# Patient Record
Sex: MALE | Race: WHITE | Employment: FULL TIME | RURAL
[De-identification: names, ages, dates, MRNs, and addresses within clinical notes are randomized per-mention and may not be internally consistent; named-entity substitution may affect disease eponyms.]

---

## 2021-01-21 ENCOUNTER — OFFICE VISIT (OUTPATIENT)
Dept: CARDIOLOGY CLINIC | Age: 66
End: 2021-01-21
Payer: MEDICARE

## 2021-01-21 VITALS
OXYGEN SATURATION: 97 % | HEART RATE: 80 BPM | TEMPERATURE: 98.1 F | WEIGHT: 193 LBS | BODY MASS INDEX: 27.63 KG/M2 | RESPIRATION RATE: 16 BRPM | DIASTOLIC BLOOD PRESSURE: 78 MMHG | SYSTOLIC BLOOD PRESSURE: 130 MMHG | HEIGHT: 70 IN

## 2021-01-21 DIAGNOSIS — Z82.49 FH: CAD (CORONARY ARTERY DISEASE): ICD-10-CM

## 2021-01-21 DIAGNOSIS — I10 ESSENTIAL HYPERTENSION: Primary | ICD-10-CM

## 2021-01-21 DIAGNOSIS — I25.10 CORONARY ARTERY CALCIFICATION: ICD-10-CM

## 2021-01-21 DIAGNOSIS — E78.5 DYSLIPIDEMIA: ICD-10-CM

## 2021-01-21 DIAGNOSIS — I25.84 CORONARY ARTERY CALCIFICATION: ICD-10-CM

## 2021-01-21 PROCEDURE — 3017F COLORECTAL CA SCREEN DOC REV: CPT | Performed by: INTERNAL MEDICINE

## 2021-01-21 PROCEDURE — 99203 OFFICE O/P NEW LOW 30 MIN: CPT | Performed by: INTERNAL MEDICINE

## 2021-01-21 PROCEDURE — 1101F PT FALLS ASSESS-DOCD LE1/YR: CPT | Performed by: INTERNAL MEDICINE

## 2021-01-21 PROCEDURE — G8510 SCR DEP NEG, NO PLAN REQD: HCPCS | Performed by: INTERNAL MEDICINE

## 2021-01-21 PROCEDURE — G8536 NO DOC ELDER MAL SCRN: HCPCS | Performed by: INTERNAL MEDICINE

## 2021-01-21 PROCEDURE — G8419 CALC BMI OUT NRM PARAM NOF/U: HCPCS | Performed by: INTERNAL MEDICINE

## 2021-01-21 PROCEDURE — G8754 DIAS BP LESS 90: HCPCS | Performed by: INTERNAL MEDICINE

## 2021-01-21 PROCEDURE — 93000 ELECTROCARDIOGRAM COMPLETE: CPT | Performed by: INTERNAL MEDICINE

## 2021-01-21 PROCEDURE — G8752 SYS BP LESS 140: HCPCS | Performed by: INTERNAL MEDICINE

## 2021-01-21 PROCEDURE — G8427 DOCREV CUR MEDS BY ELIG CLIN: HCPCS | Performed by: INTERNAL MEDICINE

## 2021-01-21 RX ORDER — TRAVOPROST OPHTHALMIC SOLUTION 0.04 MG/ML
1 SOLUTION OPHTHALMIC EVERY EVENING
COMMUNITY
End: 2022-04-12

## 2021-01-21 RX ORDER — IRBESARTAN 75 MG/1
37.5 TABLET ORAL
COMMUNITY

## 2021-01-21 RX ORDER — AZELAIC ACID 0.15 G/G
GEL TOPICAL
COMMUNITY

## 2021-01-21 NOTE — PROGRESS NOTES
Verified patient with two patient identifiers. Medications reviewed/approved by Dr. Rahat Fleming. Chief Complaint   Patient presents with    New Patient     Referred by Edd Ely NP    Hypertension     1. Have you been to the ER, urgent care clinic since your last visit? Hospitalized since your last visit? new pt    2. Have you seen or consulted any other health care providers outside of the [address] [place] [address] since your last visit? Include any pap smears or colon screening.  New pt

## 2021-01-21 NOTE — PROGRESS NOTES
Tu Mirza is a 77 y.o. male is here for cardiacevaluation--elevated coronary artery calcium score (VCS 20)--, LCX 17.8, , Total 362 (75%). Hx hypertension, dyslipidemia on rx. No CV sx. Labs 10/9/20 with CBC, CMP normal, chol 121, LDL 45, HDL 33,  0n low dose crestor). The patient denies chest pain/ shortness of breath, orthopnea, PND, LE edema, palpitations, syncope, presyncope or fatigue. Patient Active Problem List    Diagnosis Date Noted    Essential hypertension 2021    Dyslipidemia 2021    Coronary artery calcification 2021      Rory Roberts NP  Past Medical History:   Diagnosis Date    Coronary artery calcification     Dyslipidemia     Fatty liver 2006    GERD (gastroesophageal reflux disease)     GERD (gastroesophageal reflux disease)     Hypertension     Shoulder fracture 2016      Past Surgical History:   Procedure Laterality Date    HX ROTATOR CUFF REPAIR Right 2016    IR BX LIVER PERCUTANEOUS  2006     No Known Allergies   Family History   Problem Relation Age of Onset    Breast Cancer Mother     Heart Attack Father          52's MI    Diabetes Father     High Cholesterol Father       Social History     Socioeconomic History    Marital status:      Spouse name: Not on file    Number of children: Not on file    Years of education: Not on file    Highest education level: Not on file   Occupational History    Not on file   Social Needs    Financial resource strain: Not on file    Food insecurity     Worry: Not on file     Inability: Not on file    Transportation needs     Medical: Not on file     Non-medical: Not on file   Tobacco Use    Smoking status: Never Smoker    Smokeless tobacco: Never Used   Substance and Sexual Activity    Alcohol use:  Yes     Alcohol/week: 3.0 standard drinks     Types: 3 Cans of beer per week     Comment: 3 drink per week    Drug use: Not on file    Sexual activity: Not on file   Lifestyle    Physical activity     Days per week: Not on file     Minutes per session: Not on file    Stress: Not on file   Relationships    Social connections     Talks on phone: Not on file     Gets together: Not on file     Attends Confucianism service: Not on file     Active member of club or organization: Not on file     Attends meetings of clubs or organizations: Not on file     Relationship status: Not on file    Intimate partner violence     Fear of current or ex partner: Not on file     Emotionally abused: Not on file     Physically abused: Not on file     Forced sexual activity: Not on file   Other Topics Concern    Not on file   Social History Narrative    Not on file      Current Outpatient Medications   Medication Sig    travoprost (Travatan Z) 0.004 % ophthalmic solution Administer 1 Drop to both eyes every evening.  azelaic acid (Finacea) 15 % topical gel Apply  to affected area four (4) days a week.  irbesartan (AVAPRO) 75 mg tablet Take 75 mg by mouth nightly.  omeprazole (PRILOSEC) 10 mg capsule Take 10 mg by mouth daily.  rosuvastatin (CRESTOR) 10 mg tablet Take 10 mg by mouth nightly. 1/2 tablet    aspirin 81 mg chewable tablet Take 81 mg by mouth daily.  multivitamin (ONE A DAY) tablet Take 1 Tab by mouth daily. No current facility-administered medications for this visit. Review of Symptoms:    CONST  No weight change. No fever, chills, sweats    ENT No visual changes, URI sx, sore throat    CV  See HPI   RESP  No cough, or sputum, wheezing. Also see HPI   GI  No abdominal pain or change in bowel habits. No heartburn or dysphagia. No melena or rectal bleeding.   No dysuria, urgency, frequency, hematuria   MSKEL  No joint pain, swelling. No muscle pain. SKIN  No rash or lesions. NEURO  No headache, syncope, or seizure. No weakness, loss of sensation, or paresthesias. PSYCH  No low mood or depression  No anxiety. HE/LYMPH  No easy bruising, abnormal bleeding, or enlarged glands. Physical ExamPhysical Exam:    Visit Vitals  /78 (BP 1 Location: Left arm, BP Patient Position: Sitting)   Pulse 80   Temp 98.1 °F (36.7 °C) (Temporal)   Resp 16   Ht 5' 10\" (1.778 m)   Wt 193 lb (87.5 kg)   SpO2 97% Comment: ra   BMI 27.69 kg/m²     Gen: NAD  HEENT:  PERRL, throat clear  Neck: no adenopathy, no thyromegaly, no JVD   Heart:  Regular,Nl S1S2,  no murmur, gallop or rub. Lungs:  clear  Abdomen:   Soft, non-tender, bowel sounds are active. Extremities:  No edema  Pulse: symmetric  Neuro: A&O times 3, No focal neuro deficits    Cardiographics    ECG: from 10/11/19--NSR, wnl      Labs:   Lab Results   Component Value Date/Time    Sodium 142 01/28/2016 01:40 PM    Potassium 4.3 01/28/2016 01:40 PM    Chloride 103 01/28/2016 01:40 PM    CO2 30 01/28/2016 01:40 PM    Anion gap 9 01/28/2016 01:40 PM    Glucose 80 01/28/2016 01:40 PM    BUN 17 01/17/2018 07:43 AM    BUN 13 01/28/2016 01:40 PM    Creatinine 1.14 01/17/2018 07:43 AM    Creatinine 0.89 01/28/2016 01:40 PM    BUN/Creatinine ratio 15 01/28/2016 01:40 PM    GFR est AA >60 01/17/2018 07:43 AM    GFR est AA >60 01/28/2016 01:40 PM    GFR est non-AA >60 01/17/2018 07:43 AM    GFR est non-AA >60 01/28/2016 01:40 PM    Calcium 8.8 01/28/2016 01:40 PM     No results found for: CPK, CPKX, CPX  No results found for: CHOL, CHOLX, CHLST, CHOLV, 122353, HDL, HDLP, LDL, LDLC, DLDLP, TGLX, TRIGL, TRIGP, CHHD, CHHDX  No results found for this or any previous visit. Assessment:         Patient Active Problem List    Diagnosis Date Noted    Essential hypertension 01/21/2021    Dyslipidemia 01/21/2021    Coronary artery calcification 01/21/2021      77 y.o. male is here for cardiacevaluation--elevated coronary artery calcium score (VCS 11/13/20)--, LCX 17.8, , Total 362 (75%). Hx hypertension, dyslipidemia on rx. No CV sx.   Labs 10/9/20 with CBC, CMP normal, chol 121, LDL 45, HDL 33,  0n low dose crestor). Plan:     Doing well with no adverse cardiac symptoms. Elevated coronary calcium score as noted--CV risks with hypertension, dyslipidemia--well controlled. Fishoil/omega 3   Dietary rx  Continue statin, ARB  ASA 81  Stress MPI--r/o ischemia   Lipids and labs followed by PCP. Continue current care and f/u in 12 months.     Juana Mcghee MD

## 2021-01-21 NOTE — LETTER
1/21/2021 Patient: Мария Grissom YOB: 1955 Date of Visit: 1/21/2021 Lindsey Rodriguez NP 
[address] [facility] [phone] Via Fax: [phone] Dear Lindsey Rodriguez NP, Thank you for referring Mr. Ruthann Day to [facility] for evaluation. My notes for this consultation are attached. If you have questions, please do not hesitate to call me. I look forward to following your patient along with you.  
 
 
Sincerely, 
 
Gerry Yen MD

## 2021-02-23 ENCOUNTER — TELEPHONE (OUTPATIENT)
Dept: CARDIOLOGY CLINIC | Age: 66
End: 2021-02-23

## 2021-02-23 NOTE — TELEPHONE ENCOUNTER
----- Message from Ariel Friedman MD sent at 2/23/2021  9:33 AM EST -----  Regarding: Stress Cardiolyte  Advise stress nuclear treadmill test completely normal--no blockages or ischemia on nuclear imaging, normal pumping function, overall normal.   RTC 12 mos, sooner prn.   Thanks [facility]

## 2021-08-03 ENCOUNTER — TRANSCRIBE ORDER (OUTPATIENT)
Dept: REGISTRATION | Age: 66
End: 2021-08-03

## 2021-08-03 ENCOUNTER — HOSPITAL ENCOUNTER (OUTPATIENT)
Dept: GENERAL RADIOLOGY | Age: 66
Discharge: HOME | End: 2021-08-03
Payer: MEDICARE

## 2021-08-03 DIAGNOSIS — M54.2 CERVICALGIA: ICD-10-CM

## 2021-08-03 DIAGNOSIS — M25.552 LEFT HIP PAIN: ICD-10-CM

## 2021-08-03 DIAGNOSIS — M54.9 DORSALGIA: ICD-10-CM

## 2021-08-03 DIAGNOSIS — M25.512 LEFT SHOULDER PAIN: Primary | ICD-10-CM

## 2021-08-03 DIAGNOSIS — M25.512 LEFT SHOULDER PAIN: ICD-10-CM

## 2021-08-03 DIAGNOSIS — M54.2 CERVICALGIA: Primary | ICD-10-CM

## 2021-08-03 PROCEDURE — 73502 X-RAY EXAM HIP UNI 2-3 VIEWS: CPT

## 2021-08-03 PROCEDURE — 72050 X-RAY EXAM NECK SPINE 4/5VWS: CPT

## 2021-08-03 PROCEDURE — 72070 X-RAY EXAM THORAC SPINE 2VWS: CPT

## 2021-08-03 PROCEDURE — 72110 X-RAY EXAM L-2 SPINE 4/>VWS: CPT

## 2021-08-03 PROCEDURE — 73030 X-RAY EXAM OF SHOULDER: CPT

## 2022-03-18 PROBLEM — I25.84 CORONARY ARTERY CALCIFICATION: Status: ACTIVE | Noted: 2021-01-21

## 2022-03-18 PROBLEM — I25.10 CORONARY ARTERY CALCIFICATION: Status: ACTIVE | Noted: 2021-01-21

## 2022-03-19 PROBLEM — E78.5 DYSLIPIDEMIA: Status: ACTIVE | Noted: 2021-01-21

## 2022-03-19 PROBLEM — I10 ESSENTIAL HYPERTENSION: Status: ACTIVE | Noted: 2021-01-21

## 2022-04-09 NOTE — PROGRESS NOTES
Elvin Middleton is a 74 y.o. male is here for follow up. Pmhx CAD as evidenced by elevated coronary artery calcium score (VCS 20)--, LCX 17.8, , Total 362 (75%). Hx hypertension, dyslipidemia on rx. Last seen by Dr Yasemin Barrera 2021. He had NST on 2021 which was fine. Most recent lipids 10/2021 showed , HDL 31,  and LDL 52. Continues to do well from cardiac standpoint. He works out about 3 days a week,  More so during warmer weather. No exertional symptoms. The patient denies chest pain/ shortness of breath, orthopnea, PND, LE edema, palpitations, syncope, presyncope or fatigue. Patient Active Problem List    Diagnosis Date Noted    Essential hypertension 2021    Dyslipidemia 2021    Coronary artery calcification 2021      Jose Juan Marino NP  Past Medical History:   Diagnosis Date    Coronary artery calcification     Dyslipidemia     Fatty liver 2006    GERD (gastroesophageal reflux disease)     GERD (gastroesophageal reflux disease)     Hypertension     Shoulder fracture 2016      Past Surgical History:   Procedure Laterality Date    HX ROTATOR CUFF REPAIR Right 2016    IR BX LIVER PERCUTANEOUS  2006     No Known Allergies   Family History   Problem Relation Age of Onset    Breast Cancer Mother     Heart Attack Father          52's MI    Diabetes Father     High Cholesterol Father       Social History     Socioeconomic History    Marital status:      Spouse name: Not on file    Number of children: Not on file    Years of education: Not on file    Highest education level: Not on file   Occupational History    Not on file   Tobacco Use    Smoking status: Never Smoker    Smokeless tobacco: Never Used   Substance and Sexual Activity    Alcohol use:  Yes     Alcohol/week: 3.0 standard drinks     Types: 3 Cans of beer per week     Comment: 3 drink per week    Drug use: Not on file    Sexual activity: Not on file   Other Topics Concern    Not on file   Social History Narrative    Not on file     Social Determinants of Health     Financial Resource Strain:     Difficulty of Paying Living Expenses: Not on file   Food Insecurity:     Worried About Running Out of Food in the Last Year: Not on file    Jairo of Food in the Last Year: Not on file   Transportation Needs:     Lack of Transportation (Medical): Not on file    Lack of Transportation (Non-Medical): Not on file   Physical Activity:     Days of Exercise per Week: Not on file    Minutes of Exercise per Session: Not on file   Stress:     Feeling of Stress : Not on file   Social Connections:     Frequency of Communication with Friends and Family: Not on file    Frequency of Social Gatherings with Friends and Family: Not on file    Attends Cheondoism Services: Not on file    Active Member of [address] [place] [address] [employer] or Organizations: Not on file    Attends Club or Organization Meetings: Not on file    Marital Status: Not on file   Intimate Partner Violence:     Fear of Current or Ex-Partner: Not on file    Emotionally Abused: Not on file    Physically Abused: Not on file    Sexually Abused: Not on file   Housing Stability:     Unable to Pay for Housing in the Last Year: Not on file    Number of [place] in the Last Year: Not on file    Unstable Housing in the Last Year: Not on file      Current Outpatient Medications   Medication Sig    travoprost (Travatan Z) 0.004 % ophthalmic solution Administer 1 Drop to both eyes every evening.  azelaic acid (Finacea) 15 % topical gel Apply  to affected area four (4) days a week.  irbesartan (AVAPRO) 75 mg tablet Take 75 mg by mouth nightly.  omeprazole (PRILOSEC) 10 mg capsule Take 10 mg by mouth daily.  rosuvastatin (CRESTOR) 10 mg tablet Take 10 mg by mouth nightly. 1/2 tablet    aspirin 81 mg chewable tablet Take 81 mg by mouth daily.     multivitamin (ONE A DAY) tablet Take 1 Tab by mouth daily. No current facility-administered medications for this visit. Review of Symptoms:    CONST  No weight change. No fever, chills, sweats    ENT No visual changes, URI sx, sore throat    CV  See HPI   RESP  No cough, or sputum, wheezing. Also see HPI   GI  No abdominal pain or change in bowel habits. No heartburn or dysphagia. No melena or rectal bleeding.   No dysuria, urgency, frequency, hematuria   MSKEL  No joint pain, swelling. No muscle pain. SKIN  No rash or lesions. NEURO  No headache, syncope, or seizure. No weakness, loss of sensation, or paresthesias. PSYCH  No low mood or depression  No anxiety. HE/LYMPH  No easy bruising, abnormal bleeding, or enlarged glands. Physical ExamPhysical Exam:    There were no vitals taken for this visit. Gen: NAD  HEENT:  PERRL, throat clear  Neck: no adenopathy, no thyromegaly, no JVD   Heart:  Regular,Nl S1S2,  no murmur, gallop or rub. Lungs:  clear  Abdomen:   Soft, non-tender, bowel sounds are active.    Extremities:  No edema  Pulse: symmetric  Neuro: A&O times 3, No focal neuro deficits    Cardiographics    ECG: NSR, wnl      Labs:   Lab Results   Component Value Date/Time    Sodium 142 01/28/2016 01:40 PM    Potassium 4.3 01/28/2016 01:40 PM    Chloride 103 01/28/2016 01:40 PM    CO2 30 01/28/2016 01:40 PM    Anion gap 9 01/28/2016 01:40 PM    Glucose 80 01/28/2016 01:40 PM    BUN 17 01/17/2018 07:43 AM    BUN 13 01/28/2016 01:40 PM    Creatinine 1.14 01/17/2018 07:43 AM    Creatinine 0.89 01/28/2016 01:40 PM    BUN/Creatinine ratio 15 01/28/2016 01:40 PM    GFR est AA >60 01/17/2018 07:43 AM    GFR est AA >60 01/28/2016 01:40 PM    GFR est non-AA >60 01/17/2018 07:43 AM    GFR est non-AA >60 01/28/2016 01:40 PM    Calcium 8.8 01/28/2016 01:40 PM     No results found for: CPK, CPKX, CPX  No results found for: CHOL, CHOLX, CHLST, CHOLV, 187523, HDL, HDLP, LDL, LDLC, DLDLP, TGLX, TRIGL, TRIGP, CHHD, CHHDX  No results found for this or any previous visit. Assessment:         Patient Active Problem List    Diagnosis Date Noted    Essential hypertension 01/21/2021    Dyslipidemia 01/21/2021    Coronary artery calcification 01/21/2021      77 y.o. male is here for cardiacevaluation--elevated coronary artery calcium score (VCS 11/13/20)--, LCX 17.8, , Total 362 (75%). Hx hypertension, dyslipidemia on rx. No CV sx. Labs 10/9/20 with CBC, CMP normal, chol 121, LDL 45, HDL 33,  0n low dose crestor). Doing well with no adverse cardiac symptoms. Elevated coronary calcium score as noted--CV risks with hypertension, dyslipidemia--well controlled. Fishoil/omega 3   Dietary rx  Continue statin, ARB  ASA 81  Stress MPI--r/o ischemia   Lipids and labs followed by PCP. NST 2/23/2021  Impression:   Normal gated rest/stress myocardial perfusion imaging study. No evidence of myocardial ischemia or infarction. Left ventricular ejection fraction is 71 %.          Plan:       Agatston CAC score 200-399  Coronary artery calcification:  (VCS 11/13/20)--, LCX 17.8, , Total 362 (75%)  FH: CAD (coronary artery disease)  No evidence of myocardial ischemia or infarction. Left ventricular ejection fraction is 71 %. Per NST in 2/23/2021  Continue ASA statin      Essential hypertension  Controlled with current therapy  Stable Serum Cr 0.9 in 10/2021    HLD  10/2021 LDL 52 On rosuva 10 mg Labs and lipids per PCP    Discussed importance of diet and exercise - eventual goal of 30 - 60 minutes, 5-7 times a week as per AHA guideline. Goal of 10 % (19 lbs) weight loss for 6 months. Continue current care and f/u in 12 months.     Coby Dunn NP

## 2022-04-12 ENCOUNTER — OFFICE VISIT (OUTPATIENT)
Dept: CARDIOLOGY CLINIC | Age: 67
End: 2022-04-12
Payer: MEDICARE

## 2022-04-12 VITALS
WEIGHT: 193 LBS | HEIGHT: 70 IN | TEMPERATURE: 98.3 F | DIASTOLIC BLOOD PRESSURE: 82 MMHG | OXYGEN SATURATION: 97 % | RESPIRATION RATE: 18 BRPM | HEART RATE: 73 BPM | SYSTOLIC BLOOD PRESSURE: 144 MMHG | BODY MASS INDEX: 27.63 KG/M2

## 2022-04-12 DIAGNOSIS — I10 ESSENTIAL HYPERTENSION: Primary | ICD-10-CM

## 2022-04-12 DIAGNOSIS — I25.84 CORONARY ARTERY CALCIFICATION: ICD-10-CM

## 2022-04-12 DIAGNOSIS — Z82.49 FH: CAD (CORONARY ARTERY DISEASE): ICD-10-CM

## 2022-04-12 DIAGNOSIS — R93.1 AGATSTON CAC SCORE 200-399: ICD-10-CM

## 2022-04-12 DIAGNOSIS — E78.5 DYSLIPIDEMIA: ICD-10-CM

## 2022-04-12 DIAGNOSIS — I25.10 CORONARY ARTERY CALCIFICATION: ICD-10-CM

## 2022-04-12 PROCEDURE — G8419 CALC BMI OUT NRM PARAM NOF/U: HCPCS | Performed by: NURSE PRACTITIONER

## 2022-04-12 PROCEDURE — 99214 OFFICE O/P EST MOD 30 MIN: CPT | Performed by: NURSE PRACTITIONER

## 2022-04-12 PROCEDURE — 3017F COLORECTAL CA SCREEN DOC REV: CPT | Performed by: NURSE PRACTITIONER

## 2022-04-12 PROCEDURE — G8536 NO DOC ELDER MAL SCRN: HCPCS | Performed by: NURSE PRACTITIONER

## 2022-04-12 PROCEDURE — 1101F PT FALLS ASSESS-DOCD LE1/YR: CPT | Performed by: NURSE PRACTITIONER

## 2022-04-12 PROCEDURE — G8432 DEP SCR NOT DOC, RNG: HCPCS | Performed by: NURSE PRACTITIONER

## 2022-04-12 PROCEDURE — 93000 ELECTROCARDIOGRAM COMPLETE: CPT | Performed by: NURSE PRACTITIONER

## 2022-04-12 PROCEDURE — G8753 SYS BP > OR = 140: HCPCS | Performed by: NURSE PRACTITIONER

## 2022-04-12 PROCEDURE — G8754 DIAS BP LESS 90: HCPCS | Performed by: NURSE PRACTITIONER

## 2022-04-12 PROCEDURE — G8427 DOCREV CUR MEDS BY ELIG CLIN: HCPCS | Performed by: NURSE PRACTITIONER

## 2022-04-12 RX ORDER — LATANOPROST 50 UG/ML
1 SOLUTION/ DROPS OPHTHALMIC
COMMUNITY
Start: 2022-02-18

## 2022-04-12 RX ORDER — MELATONIN
1000 DAILY
COMMUNITY

## 2022-04-12 NOTE — PROGRESS NOTES
Identified pt with two pt identifiers(name and ). Reviewed record in preparation for visit and have obtained necessary documentation. Chief Complaint   Patient presents with    Coronary Artery Disease     follow up    Hypertension    Cholesterol Problem      Vitals:    22 1101   BP: (!) 144/82   Pulse: 73   Resp: 18   Temp: 98.3 °F (36.8 °C)   TempSrc: Temporal   SpO2: 97%   Weight: 193 lb (87.5 kg)   Height: 5' 10\" (1.778 m)   PainSc:   0 - No pain       Medications reviewed/approved by provider. Health Maintenance Review: Patient reminded of \"due or due soon\" health maintenance. I have asked the patient to contact his/her primary care provider (PCP) for follow-up on his/her health maintenance. Coordination of Care Questionnaire:  :   1) Have you been to an emergency room, urgent care, or hospitalized since your last visit? If yes, where when, and reason for visit? no       2. Have seen or consulted any other health care provider since your last visit? If yes, where when, and reason for visit? YES Janeen Childress NP for routine care. Patient is accompanied by self I have received verbal consent from Sammy Evans to discuss any/all medical information while they are present in the room.

## 2022-10-12 ENCOUNTER — HOSPITAL ENCOUNTER (OUTPATIENT)
Dept: MRI IMAGING | Age: 67
Discharge: HOME | End: 2022-10-12
Attending: NURSE PRACTITIONER
Payer: MEDICARE

## 2022-10-12 DIAGNOSIS — M54.41 LUMBAGO WITH SCIATICA, RIGHT SIDE: ICD-10-CM

## 2022-10-12 PROCEDURE — 72158 MRI LUMBAR SPINE W/O & W/DYE: CPT

## 2022-10-12 PROCEDURE — A9576 INJ PROHANCE MULTIPACK: HCPCS

## 2022-10-12 PROCEDURE — 74011250636 HC RX REV CODE- 250/636

## 2022-10-12 RX ADMIN — GADOTERIDOL 18 ML: 279.3 INJECTION, SOLUTION INTRAVENOUS at 08:46

## 2024-08-22 ENCOUNTER — TRANSCRIBE ORDERS (OUTPATIENT)
Facility: HOSPITAL | Age: 69
End: 2024-08-22

## 2024-08-22 DIAGNOSIS — M47.26 OSTEOARTHRITIS OF SPINE WITH RADICULOPATHY, LUMBAR REGION: ICD-10-CM

## 2024-08-22 DIAGNOSIS — M51.26 LUMBAR DISC HERNIATION: ICD-10-CM

## 2024-08-22 DIAGNOSIS — M54.32 LEFT SIDED SCIATICA: ICD-10-CM

## 2024-08-22 DIAGNOSIS — M54.50 LUMBAR PAIN: Primary | ICD-10-CM

## 2024-08-22 DIAGNOSIS — M54.2 CERVICALGIA: ICD-10-CM

## 2024-09-03 ENCOUNTER — HOSPITAL ENCOUNTER (OUTPATIENT)
Facility: HOSPITAL | Age: 69
Discharge: HOME | End: 2024-09-06
Attending: ORTHOPAEDIC SURGERY
Payer: MEDICARE

## 2024-09-03 DIAGNOSIS — M54.2 CERVICALGIA: ICD-10-CM

## 2024-09-03 DIAGNOSIS — M54.50 LUMBAR PAIN: ICD-10-CM

## 2024-09-03 DIAGNOSIS — M47.26 OSTEOARTHRITIS OF SPINE WITH RADICULOPATHY, LUMBAR REGION: ICD-10-CM

## 2024-09-03 DIAGNOSIS — M51.26 LUMBAR DISC HERNIATION: ICD-10-CM

## 2024-09-03 DIAGNOSIS — M54.32 LEFT SIDED SCIATICA: ICD-10-CM

## 2024-09-03 PROCEDURE — 72141 MRI NECK SPINE W/O DYE: CPT

## 2024-09-03 PROCEDURE — 72148 MRI LUMBAR SPINE W/O DYE: CPT

## 2024-11-20 ENCOUNTER — TELEPHONE (OUTPATIENT)
Age: 69
End: 2024-11-20

## 2024-11-20 NOTE — TELEPHONE ENCOUNTER
Patient states he would like a call back to get an appointment with  for numbness he has been experiencing in his arms.      Phone [phone]

## 2024-11-22 ENCOUNTER — TELEPHONE (OUTPATIENT)
Age: 69
End: 2024-11-22

## 2024-11-22 NOTE — TELEPHONE ENCOUNTER
Patient has been schedule as an Established patient with the provider for 11/25/24 at the [facility].Patient last visit was with ROWENA Rodas in 2023.    Patient has Bilateral tingling and numbness in both arms.    [phone]

## 2024-11-23 PROBLEM — I25.10 ASCVD (ARTERIOSCLEROTIC CARDIOVASCULAR DISEASE): Status: ACTIVE | Noted: 2021-01-21

## 2024-11-23 PROBLEM — I10 PRIMARY HYPERTENSION: Status: ACTIVE | Noted: 2021-01-21

## 2024-11-23 NOTE — PROGRESS NOTES
ASSESSMENT and PLAN  1.  Bilateral arm weakness  Atypical for a cardiac etiology.  Suspect this may be related to his C-spine disease.  He remains concerned the symptoms may represent a cardiac problem and so for completeness, I recommended reevaluation with an exercise Cardiolite stress test.    2. ASCVD (arteriosclerotic cardiovascular disease)  Calcium score 362 (75th percentile) on cardiac CT 11/13/2020 and no ischemia at 13.4 METS on GXT Cardiolite 2/23/2021.  Continue risk factor modification efforts.  LDL goal <70.    3. Primary hypertension  Well-controlled.  Continue current medications.  Managed by PCP.       Follow-up in 1 year.  In the interim, we will contact him with the results of his exercise Cardiolite stress test and any additional recommendations.  The patient has been instructed and agrees to call our office with any issues or other concerns related to their cardiac condition(s) and/or complaint(s).      CHIEF COMPLAINT  Arm numbness    HPI:    Prakash Rae is a 69 y.o. male here for an acute visit at his request.  He was last seen in the office 4/12/2023 by Danna Rodas NP for follow-up regarding coronary calcification and cardiac risk factors.  His last cardiac study was a exercise Cardiolite stress test on 2/23/2021 that was negative for ischemia at a workload of 13.4 METS.  No acute cardiac issues have developed since the last visit.    He wanted to be seen regarding 2 recent episodes of transient bilateral arm weakness without associated tingling, numbness, chest pain, dyspnea or palpitations.  Both arms felt weak but he was able to move them.  On both occasions, the symptoms occurred at rest and on one occasion after he had been laying with his arms above his head for a prolonged period of time.  He reported neck pain, cracking and popping in the neck and pins and needle sensations in his arms at a visit with his orthopedic surgeon on 8/21/2024 and a subsequent MRI revealed

## 2024-11-25 ENCOUNTER — OFFICE VISIT (OUTPATIENT)
Age: 69
End: 2024-11-25

## 2024-11-25 VITALS
WEIGHT: 189 LBS | RESPIRATION RATE: 20 BRPM | BODY MASS INDEX: 27.06 KG/M2 | OXYGEN SATURATION: 97 % | HEART RATE: 87 BPM | DIASTOLIC BLOOD PRESSURE: 70 MMHG | TEMPERATURE: 98.2 F | HEIGHT: 70 IN | SYSTOLIC BLOOD PRESSURE: 126 MMHG

## 2024-11-25 DIAGNOSIS — I10 PRIMARY HYPERTENSION: ICD-10-CM

## 2024-11-25 DIAGNOSIS — I25.10 ASCVD (ARTERIOSCLEROTIC CARDIOVASCULAR DISEASE): Primary | ICD-10-CM

## 2024-11-25 DIAGNOSIS — R29.898 BILATERAL ARM WEAKNESS: ICD-10-CM

## 2024-11-25 RX ORDER — OLMESARTAN MEDOXOMIL 20 MG/1
20 TABLET ORAL DAILY
COMMUNITY

## 2024-11-25 ASSESSMENT — PATIENT HEALTH QUESTIONNAIRE - PHQ9
SUM OF ALL RESPONSES TO PHQ QUESTIONS 1-9: 0
SUM OF ALL RESPONSES TO PHQ QUESTIONS 1-9: 0
SUM OF ALL RESPONSES TO PHQ9 QUESTIONS 1 & 2: 0
2. FEELING DOWN, DEPRESSED OR HOPELESS: NOT AT ALL
SUM OF ALL RESPONSES TO PHQ QUESTIONS 1-9: 0
SUM OF ALL RESPONSES TO PHQ QUESTIONS 1-9: 0
1. LITTLE INTEREST OR PLEASURE IN DOING THINGS: NOT AT ALL

## 2024-11-25 NOTE — PROGRESS NOTES
Identified pt with two pt identifiers(name and ). Reviewed record in preparation for visit and have obtained necessary documentation.  Chief Complaint   Patient presents with    Coronary Artery Disease    Hypertension      /70 (Site: Left Upper Arm, Position: Sitting, Cuff Size: Medium Adult)   Pulse 87   Temp 98.2 °F (36.8 °C) (Temporal)   Resp 20   Ht 1.778 m (5' 10\")   Wt 85.7 kg (189 lb)   SpO2 97%   BMI 27.12 kg/m²       Medications reviewed/approved by provider.      Health Maintenance Review: Patient reminded of \"due or due soon\" health maintenance. I have asked the patient to contact his/her primary care provider (PCP) for follow-up on his/her health maintenance.    Coordination of Care Questionnaire:  :   1) Have you been to an emergency room, urgent care, or hospitalized since your last visit?  If yes, where when, and reason for visit? no       2. Have seen or consulted any other health care provider since your last visit?   If yes, where when, and reason for visit?  no      Patient is accompanied by self I have received verbal consent from Prakash Rae to discuss any/all medical information while they are present in the room.

## 2024-11-25 NOTE — PATIENT INSTRUCTIONS
I have ordered an exercise Cardiolite stress test for further evaluation of your symptoms.  We will contact you with the results.   83

## 2025-01-30 ENCOUNTER — TELEPHONE (OUTPATIENT)
Facility: HOSPITAL | Age: 70
End: 2025-01-30

## 2025-01-30 NOTE — TELEPHONE ENCOUNTER
Spoke with pt via phone, discussed prep/education for stress test Tuesday 2/4, pt has no questions at this time.